# Patient Record
Sex: MALE | Race: WHITE
[De-identification: names, ages, dates, MRNs, and addresses within clinical notes are randomized per-mention and may not be internally consistent; named-entity substitution may affect disease eponyms.]

---

## 2021-03-19 ENCOUNTER — HOSPITAL ENCOUNTER (INPATIENT)
Dept: HOSPITAL 54 - ER | Age: 41
LOS: 6 days | Discharge: HOME | DRG: 254 | End: 2021-03-25
Attending: INTERNAL MEDICINE | Admitting: INTERNAL MEDICINE
Payer: MEDICAID

## 2021-03-19 VITALS — DIASTOLIC BLOOD PRESSURE: 49 MMHG | SYSTOLIC BLOOD PRESSURE: 97 MMHG

## 2021-03-19 VITALS — HEIGHT: 66 IN | BODY MASS INDEX: 16.61 KG/M2 | WEIGHT: 103.38 LBS

## 2021-03-19 VITALS — DIASTOLIC BLOOD PRESSURE: 55 MMHG | SYSTOLIC BLOOD PRESSURE: 109 MMHG

## 2021-03-19 VITALS — DIASTOLIC BLOOD PRESSURE: 56 MMHG | SYSTOLIC BLOOD PRESSURE: 110 MMHG

## 2021-03-19 VITALS — SYSTOLIC BLOOD PRESSURE: 117 MMHG | DIASTOLIC BLOOD PRESSURE: 57 MMHG

## 2021-03-19 VITALS — SYSTOLIC BLOOD PRESSURE: 97 MMHG | DIASTOLIC BLOOD PRESSURE: 49 MMHG

## 2021-03-19 VITALS — DIASTOLIC BLOOD PRESSURE: 63 MMHG | SYSTOLIC BLOOD PRESSURE: 123 MMHG

## 2021-03-19 DIAGNOSIS — R64: ICD-10-CM

## 2021-03-19 DIAGNOSIS — B20: ICD-10-CM

## 2021-03-19 DIAGNOSIS — E86.1: ICD-10-CM

## 2021-03-19 DIAGNOSIS — B95.7: ICD-10-CM

## 2021-03-19 DIAGNOSIS — E87.1: ICD-10-CM

## 2021-03-19 DIAGNOSIS — L89.313: ICD-10-CM

## 2021-03-19 DIAGNOSIS — Z88.0: ICD-10-CM

## 2021-03-19 DIAGNOSIS — L89.323: ICD-10-CM

## 2021-03-19 DIAGNOSIS — D64.9: ICD-10-CM

## 2021-03-19 DIAGNOSIS — K62.89: Primary | ICD-10-CM

## 2021-03-19 DIAGNOSIS — F15.11: ICD-10-CM

## 2021-03-19 DIAGNOSIS — K52.9: ICD-10-CM

## 2021-03-19 DIAGNOSIS — Z20.822: ICD-10-CM

## 2021-03-19 DIAGNOSIS — Z91.14: ICD-10-CM

## 2021-03-19 DIAGNOSIS — N28.9: ICD-10-CM

## 2021-03-19 DIAGNOSIS — B96.20: ICD-10-CM

## 2021-03-19 DIAGNOSIS — Z59.0: ICD-10-CM

## 2021-03-19 LAB
BASOPHILS # BLD AUTO: 0 /CMM (ref 0–0.2)
BASOPHILS NFR BLD AUTO: 0.3 % (ref 0–2)
BUN SERPL-MCNC: 17 MG/DL (ref 7–18)
CALCIUM SERPL-MCNC: 8.7 MG/DL (ref 8.5–10.1)
CHLORIDE SERPL-SCNC: 101 MMOL/L (ref 98–107)
CO2 SERPL-SCNC: 22 MMOL/L (ref 21–32)
COLOR UR: YELLOW
CREAT SERPL-MCNC: 1 MG/DL (ref 0.6–1.3)
DEPRECATED SQUAMOUS URNS QL MICRO: (no result) /HPF
EOSINOPHIL NFR BLD AUTO: 2.2 % (ref 0–6)
EOSINOPHIL NFR BLD MANUAL: 3 % (ref 0–4)
GLUCOSE SERPL-MCNC: 108 MG/DL (ref 74–106)
HCT VFR BLD AUTO: 17 % (ref 39–51)
HGB BLD-MCNC: 5.5 G/DL (ref 13.5–17.5)
IRON SERPL-MCNC: 21 UG/DL (ref 50–175)
LYMPHOCYTES NFR BLD AUTO: 0.4 /CMM (ref 0.8–4.8)
LYMPHOCYTES NFR BLD AUTO: 7.1 % (ref 20–44)
LYMPHOCYTES NFR BLD MANUAL: 9 % (ref 16–48)
MCHC RBC AUTO-ENTMCNC: 32 G/DL (ref 31–36)
MCV RBC AUTO: 82 FL (ref 80–96)
MONOCYTES NFR BLD AUTO: 0.6 /CMM (ref 0.1–1.3)
MONOCYTES NFR BLD AUTO: 10.8 % (ref 2–12)
MONOCYTES NFR BLD MANUAL: 4 % (ref 0–11)
NEUTROPHILS # BLD AUTO: 4.4 /CMM (ref 1.8–8.9)
NEUTROPHILS NFR BLD AUTO: 79.6 % (ref 43–81)
NEUTS BAND NFR BLD MANUAL: 1 % (ref 0–5)
NEUTS SEG NFR BLD MANUAL: 83 % (ref 42–76)
PH UR STRIP: 7.5 [PH] (ref 5–8)
PLATELET # BLD AUTO: 272 /CMM (ref 150–450)
POTASSIUM SERPL-SCNC: 4.9 MMOL/L (ref 3.5–5.1)
RBC # BLD AUTO: 2.11 MIL/UL (ref 4.5–6)
RBC #/AREA URNS HPF: (no result) /HPF (ref 0–2)
SODIUM SERPL-SCNC: 132 MMOL/L (ref 136–145)
TIBC SERPL-MCNC: 171 UG/DL (ref 250–450)
UROBILINOGEN UR STRIP-MCNC: 1 EU/DL
WBC #/AREA URNS HPF: (no result) /HPF (ref 0–3)
WBC NRBC COR # BLD AUTO: 5.6 K/UL (ref 4.3–11)

## 2021-03-19 PROCEDURE — A6248 HYDROGEL DRSG GEL FILLER: HCPCS

## 2021-03-19 PROCEDURE — 30233N1 TRANSFUSION OF NONAUTOLOGOUS RED BLOOD CELLS INTO PERIPHERAL VEIN, PERCUTANEOUS APPROACH: ICD-10-PCS | Performed by: EMERGENCY MEDICINE

## 2021-03-19 PROCEDURE — G0378 HOSPITAL OBSERVATION PER HR: HCPCS

## 2021-03-19 PROCEDURE — A6253 ABSORPT DRG > 48 SQ IN W/O B: HCPCS

## 2021-03-19 PROCEDURE — C9803 HOPD COVID-19 SPEC COLLECT: HCPCS

## 2021-03-19 PROCEDURE — A6403 STERILE GAUZE>16 <= 48 SQ IN: HCPCS

## 2021-03-19 RX ADMIN — FLUCONAZOLE SCH MG: 100 TABLET ORAL at 21:44

## 2021-03-19 SDOH — ECONOMIC STABILITY - HOUSING INSECURITY: HOMELESSNESS: Z59.0

## 2021-03-19 NOTE — NUR
BIBS. TO ER BED 6. AAOX4 NOT IN RESP DISTRESS. AMBULATORY. CAME IN FOR A 
CHRONIC WOUND PERIANALLY TAHT IS EXTENDING TO THE LEFT AND RIGHT BUTTOCKS. 
WOUND HAS BEEN SINCE LAST MAY. NOTED RED TISUE THAT HAVE WHITE SPOTS. AWAITING 
MD FOR GREGORY

## 2021-03-19 NOTE — NUR
RN notes

Pt refused to have SCD compression. Explained risks and benefits. Offered multiple times. Pt 
keep refusing. Will continue to monitor.

## 2021-03-19 NOTE — NUR
RN Opening notes

Received Pt from ER nurse, LC Montes. Pt arrived at the unit with a gurney and ACLS 
protocol. Pt is alert and orientedX4. Respiration is normal in room air. No SOB. No S/S of 
distress noted. Pt came with  one unit PRBC transfusing. Blood transfusion started at 2020. 
Blood consent is signed by Pt. No S/S of distress noted. No transfusion reaction noted. IV 
site at LAC# 20 is clean, intact and infusing well blood transfusion. Skin assessment is 
done and performed and picture of pt's sacrum is taken placed at pt's chart. Pt's belonging 
is checked by SHAHRAM Fair, signed by pt and placed at pt's chart. Reorient Pt to the room 
and the use of call light. Pt verbalized understanding. Safety precautions is maintained. 
Bed at low position, brakes locked, side rails upX2 and call light is within reach. Will 
continue to monitor.

## 2021-03-20 VITALS — SYSTOLIC BLOOD PRESSURE: 100 MMHG | DIASTOLIC BLOOD PRESSURE: 56 MMHG

## 2021-03-20 VITALS — DIASTOLIC BLOOD PRESSURE: 61 MMHG | SYSTOLIC BLOOD PRESSURE: 105 MMHG

## 2021-03-20 VITALS — SYSTOLIC BLOOD PRESSURE: 93 MMHG | DIASTOLIC BLOOD PRESSURE: 51 MMHG

## 2021-03-20 VITALS — DIASTOLIC BLOOD PRESSURE: 54 MMHG | SYSTOLIC BLOOD PRESSURE: 96 MMHG

## 2021-03-20 VITALS — SYSTOLIC BLOOD PRESSURE: 107 MMHG | DIASTOLIC BLOOD PRESSURE: 58 MMHG

## 2021-03-20 VITALS — DIASTOLIC BLOOD PRESSURE: 52 MMHG | SYSTOLIC BLOOD PRESSURE: 108 MMHG

## 2021-03-20 VITALS — DIASTOLIC BLOOD PRESSURE: 52 MMHG | SYSTOLIC BLOOD PRESSURE: 110 MMHG

## 2021-03-20 VITALS — SYSTOLIC BLOOD PRESSURE: 97 MMHG | DIASTOLIC BLOOD PRESSURE: 43 MMHG

## 2021-03-20 VITALS — DIASTOLIC BLOOD PRESSURE: 58 MMHG | SYSTOLIC BLOOD PRESSURE: 103 MMHG

## 2021-03-20 VITALS — SYSTOLIC BLOOD PRESSURE: 100 MMHG | DIASTOLIC BLOOD PRESSURE: 55 MMHG

## 2021-03-20 LAB
BASOPHILS # BLD AUTO: 0 /CMM (ref 0–0.2)
BASOPHILS NFR BLD AUTO: 0.5 % (ref 0–2)
BUN SERPL-MCNC: 13 MG/DL (ref 7–18)
CALCIUM SERPL-MCNC: 8.1 MG/DL (ref 8.5–10.1)
CHLORIDE SERPL-SCNC: 97 MMOL/L (ref 98–107)
CHOLEST SERPL-MCNC: 56 MG/DL (ref ?–200)
CO2 SERPL-SCNC: 20 MMOL/L (ref 21–32)
CREAT SERPL-MCNC: 0.7 MG/DL (ref 0.6–1.3)
EOSINOPHIL NFR BLD AUTO: 2.4 % (ref 0–6)
GLUCOSE SERPL-MCNC: 100 MG/DL (ref 74–106)
HCT VFR BLD AUTO: 25 % (ref 39–51)
HDLC SERPL-MCNC: < 10 MG/DL (ref 40–60)
HGB BLD-MCNC: 8.1 G/DL (ref 13.5–17.5)
LDLC SERPL DIRECT ASSAY-MCNC: 18 MG/DL (ref 0–99)
LYMPHOCYTES NFR BLD AUTO: 0.4 /CMM (ref 0.8–4.8)
LYMPHOCYTES NFR BLD AUTO: 6.9 % (ref 20–44)
MAGNESIUM SERPL-MCNC: 2 MG/DL (ref 1.8–2.4)
MCHC RBC AUTO-ENTMCNC: 32 G/DL (ref 31–36)
MCV RBC AUTO: 84 FL (ref 80–96)
MONOCYTES NFR BLD AUTO: 0.6 /CMM (ref 0.1–1.3)
MONOCYTES NFR BLD AUTO: 10.7 % (ref 2–12)
NEUTROPHILS # BLD AUTO: 4.3 /CMM (ref 1.8–8.9)
NEUTROPHILS NFR BLD AUTO: 79.5 % (ref 43–81)
PHOSPHATE SERPL-MCNC: 3.3 MG/DL (ref 2.5–4.9)
PLATELET # BLD AUTO: 256 /CMM (ref 150–450)
POTASSIUM SERPL-SCNC: 4.2 MMOL/L (ref 3.5–5.1)
RBC # BLD AUTO: 3 MIL/UL (ref 4.5–6)
SODIUM SERPL-SCNC: 128 MMOL/L (ref 136–145)
TRIGL SERPL-MCNC: 228 MG/DL (ref 30–150)
TSH SERPL DL<=0.005 MIU/L-ACNC: 3.89 UIU/ML (ref 0.36–3.74)
WBC NRBC COR # BLD AUTO: 5.4 K/UL (ref 4.3–11)

## 2021-03-20 RX ADMIN — FLUCONAZOLE SCH MG: 100 TABLET ORAL at 08:35

## 2021-03-20 RX ADMIN — SODIUM CHLORIDE PRN MLS/HR: 9 INJECTION, SOLUTION INTRAVENOUS at 04:43

## 2021-03-20 RX ADMIN — PANTOPRAZOLE SODIUM SCH MG: 40 TABLET, DELAYED RELEASE ORAL at 08:35

## 2021-03-20 RX ADMIN — DEXTROSE MONOHYDRATE SCH MLS/HR: 50 INJECTION, SOLUTION INTRAVENOUS at 21:11

## 2021-03-20 RX ADMIN — DEXTROSE MONOHYDRATE SCH MLS/HR: 50 INJECTION, SOLUTION INTRAVENOUS at 09:01

## 2021-03-20 RX ADMIN — Medication SCH MG: at 21:11

## 2021-03-20 RX ADMIN — SULFAMETHOXAZOLE AND TRIMETHOPRIM SCH UDTAB: 800; 160 TABLET ORAL at 08:35

## 2021-03-20 RX ADMIN — SODIUM CHLORIDE PRN MLS/HR: 9 INJECTION, SOLUTION INTRAVENOUS at 21:12

## 2021-03-20 NOTE — NUR
RN MS closing notes

Pt is resting in bed comfortably. Pt is alert and orientedX4. Respiration is normal in room 
air with O2 sat is 100%. No SOB. No S/S of distress noted. VS is stable. Afebrile. IV site 
at L forearm# 20 is clean, intact, flushes well and infuising well NS@ 75 ml/hr. Wound care 
provided. Safety precautions is maintained. Bed at low position, brakes locked, side rails 
upX2, urinal at the bed side and call light is within reach. Will endorse to morning nurse 
for JUSTIN.

## 2021-03-20 NOTE — NUR
MS RN CHANGE OF SHIFT NOTES



PATIENT IN BED, AWAKE, A/O X 4. PATIENT'S BREATHING EVEN AND UNLABORED, NO S/S OF 
RESPIRATORY DISTRESS, NO SOB OR DYSPNEA. IV FLUIDS RUNNING AT 75ML/HR. PATIENT TOLERATING 
ROOM AIR AT 95% O2 SAT. WOUND CARE GIVEN, WOUND COVERED WITH FOAM DRESSING. SAFETY 
PRECAUTIONS IN PLACE: BED LOCKED AND IN LOWEST POSITION, SIDE RAILS UP, URINAL AND CALL 
LIGHT WITHIN REACH. ENCOURAGED PATIENT TO USE CALL LIGHT IF IN NEED. ENDORSED TO NIGHT SHIFT 
NURSE.

## 2021-03-20 NOTE — NUR
RN notes

Second unit of PRBC is started infusing. PRBC is checked and witness by LC Harmon. VS is 
stable. Afebrile. Pt verbalize understanding. Will continue to monitor.

## 2021-03-20 NOTE — NUR
RN notes

1 Unit PRBC is done transfusing. No S/S of distress noted. No transfusion reaction noted. VS 
is stable. Pt tolerated well. Will continue to monitor.

## 2021-03-20 NOTE — NUR
MS RN OPENING NOTES



PATIENT IN BED, AWAKE, A/O X 4. PATIENT'S BREATHING EVEN AND UNLABORED, NO S/S OF 
RESPIRATORY DISTRESS, NO SOB OR DYSPNEA. IV FLUIDS RUNNING. PATIENT TOLERATING ROOM AIR AT 
98% O2 SAT. SAFETY PRECAUTIONS IN PLACE: BED LOCKED AND IN LOWEST POSITION, SIDE RAILS UP, 
URINAL AND CALL LIGHT WITHIN REACH. ENCOURAGED PATIENT TO USE CALL LIGHT IF IN NEED.

## 2021-03-20 NOTE — NUR
RN notes

Second blood transfusion is finished. VS is stable. Afebrile. No S/S of distress noted. No 
transfusion reaction noted. Pt tolerated well. Will continue to monitor.

## 2021-03-21 VITALS — SYSTOLIC BLOOD PRESSURE: 102 MMHG | DIASTOLIC BLOOD PRESSURE: 55 MMHG

## 2021-03-21 VITALS — DIASTOLIC BLOOD PRESSURE: 58 MMHG | SYSTOLIC BLOOD PRESSURE: 104 MMHG

## 2021-03-21 LAB
*BASOS: 0 %
*EOS: 3 %
*HCT: 17.5 % (ref 37.5–51)
*HGB: 5.2 G/DL (ref 13–17.7)
*IMMATURE GRANULOCYTES(ABS): 0.1 X10E3/UL (ref 0–0.1)
*IMMATURE GRANULOCYTES: 1 %
*LYMPHOCYTES: 10 %
*MCH: 24.9 PG (ref 26.6–33)
*MCHC: 29.7 G/DL (ref 31.5–35.7)
*MCV: 84 FL (ref 79–97)
*MONOCYTES: 8 %
*NEUTROPHILS: 78 %
*NRBC: (no result) %
*PLT: 249 X10E3/UL (ref 150–450)
*RBC: 2.09 X10E6/UL (ref 4.14–5.8)
*RDW: 18.3 % (ref 11.6–15.4)
ALBUMIN SERPL BCP-MCNC: 2.2 G/DL (ref 3.4–5)
ALP SERPL-CCNC: 103 U/L (ref 46–116)
ALT SERPL W P-5'-P-CCNC: 12 U/L (ref 12–78)
AST SERPL W P-5'-P-CCNC: 21 U/L (ref 15–37)
BASOPHILS # BLD AUTO: 0 /CMM (ref 0–0.2)
BASOPHILS NFR BLD AUTO: 0.5 % (ref 0–2)
BILIRUB SERPL-MCNC: 0.3 MG/DL (ref 0.2–1)
BUN SERPL-MCNC: 10 MG/DL (ref 7–18)
BUN SERPL-MCNC: 11 MG/DL (ref 7–18)
CALCIUM SERPL-MCNC: 8.3 MG/DL (ref 8.5–10.1)
CALCIUM SERPL-MCNC: 8.3 MG/DL (ref 8.5–10.1)
CHLORIDE SERPL-SCNC: 97 MMOL/L (ref 98–107)
CHLORIDE SERPL-SCNC: 98 MMOL/L (ref 98–107)
CO2 SERPL-SCNC: 23 MMOL/L (ref 21–32)
CO2 SERPL-SCNC: 24 MMOL/L (ref 21–32)
CREAT SERPL-MCNC: 0.8 MG/DL (ref 0.6–1.3)
CREAT SERPL-MCNC: 0.8 MG/DL (ref 0.6–1.3)
EOSINOPHIL NFR BLD AUTO: 1.4 % (ref 0–6)
GLUCOSE SERPL-MCNC: 103 MG/DL (ref 74–106)
GLUCOSE SERPL-MCNC: 106 MG/DL (ref 74–106)
HCT VFR BLD AUTO: 29 % (ref 39–51)
HGB BLD-MCNC: 9.1 G/DL (ref 13.5–17.5)
LYMPHOCYTES NFR BLD AUTO: 0.3 /CMM (ref 0.8–4.8)
LYMPHOCYTES NFR BLD AUTO: 6.7 % (ref 20–44)
MAGNESIUM SERPL-MCNC: 1.9 MG/DL (ref 1.8–2.4)
MAGNESIUM SERPL-MCNC: 2 MG/DL (ref 1.8–2.4)
MCHC RBC AUTO-ENTMCNC: 32 G/DL (ref 31–36)
MCV RBC AUTO: 84 FL (ref 80–96)
MONOCYTES NFR BLD AUTO: 0.5 /CMM (ref 0.1–1.3)
MONOCYTES NFR BLD AUTO: 10.3 % (ref 2–12)
NEUTROPHILS # BLD AUTO: 4.2 /CMM (ref 1.8–8.9)
NEUTROPHILS NFR BLD AUTO: 81.1 % (ref 43–81)
PHOSPHATE SERPL-MCNC: 3 MG/DL (ref 2.5–4.9)
PHOSPHATE SERPL-MCNC: 3.1 MG/DL (ref 2.5–4.9)
PLATELET # BLD AUTO: 241 /CMM (ref 150–450)
POTASSIUM SERPL-SCNC: 4.4 MMOL/L (ref 3.5–5.1)
POTASSIUM SERPL-SCNC: 4.5 MMOL/L (ref 3.5–5.1)
PROT SERPL-MCNC: 7.1 G/DL (ref 6.4–8.2)
RBC # BLD AUTO: 3.39 MIL/UL (ref 4.5–6)
SODIUM SERPL-SCNC: 128 MMOL/L (ref 136–145)
SODIUM SERPL-SCNC: 129 MMOL/L (ref 136–145)
TSH SERPL DL<=0.005 MIU/L-ACNC: 2.42 UIU/ML (ref 0.36–3.74)
URATE SERPL-MCNC: 2.8 MG/DL (ref 2.6–7.2)
WBC NRBC COR # BLD AUTO: 5.1 K/UL (ref 4.3–11)

## 2021-03-21 RX ADMIN — MEROPENEM SCH MLS/HR: 1 INJECTION INTRAVENOUS at 21:11

## 2021-03-21 RX ADMIN — DEXTROSE MONOHYDRATE SCH MLS/HR: 50 INJECTION, SOLUTION INTRAVENOUS at 20:41

## 2021-03-21 RX ADMIN — MEROPENEM SCH MLS/HR: 1 INJECTION INTRAVENOUS at 12:15

## 2021-03-21 RX ADMIN — Medication SCH MG: at 17:11

## 2021-03-21 RX ADMIN — DEXTROSE MONOHYDRATE SCH MLS/HR: 50 INJECTION, SOLUTION INTRAVENOUS at 09:38

## 2021-03-21 RX ADMIN — PANTOPRAZOLE SODIUM SCH MG: 40 TABLET, DELAYED RELEASE ORAL at 08:29

## 2021-03-21 RX ADMIN — FLUCONAZOLE SCH MG: 100 TABLET ORAL at 08:30

## 2021-03-21 RX ADMIN — SULFAMETHOXAZOLE AND TRIMETHOPRIM SCH UDTAB: 800; 160 TABLET ORAL at 08:30

## 2021-03-21 RX ADMIN — Medication SCH MG: at 08:46

## 2021-03-21 RX ADMIN — SODIUM CHLORIDE PRN MLS/HR: 9 INJECTION, SOLUTION INTRAVENOUS at 19:07

## 2021-03-21 NOTE — NUR
RN NOTES

RECEIVED PATIENT IN BED, ALERT AND ORIENTED X4, STABLE ON ROOM AIR, NO COMPLAIN OF PAIN, NO 
DISTRESS, IVF INFUSING, USES URINAL TO VOID, AMBULATES TO THE TOILET WITH SUPERVISION, KEPT 
DRY AND CLEAN, WILL CONTINUE TO MONITOR

## 2021-03-21 NOTE — NUR
MS RN CLOSING NOTES



PATIENT AWAKE AND RESTING IN BED AT THIS TIME. A/O X 4. ABLE TO MAKE NEEDS KNOWN. AMBULATORY 
WITH STEADY GAIT. TOLERATING  ROOM AIR WITH NO SOB NOTED DURING SHIFT. IV ACCESS ON LFA G#20 
INTACT AND PATENT WITH IVF OF NS @75ML/HR INFUSING WELL, NO S/S OF INFILTRATION AT SITE 
NOTED. ALL NEEDS AND CARE ATTENDED WELL. SAFETY PRECAUTIONS IN PLACE: BED LOCKED AND IN 
LOWEST POSITION, SIDE RAILS UPX2 AND CALL LIGHT WITHIN REACH. WILL ENDORSE PLAN OF CARE TO 
NIGHT SHIFT NURSE.

## 2021-03-21 NOTE — NUR
MS RN OPENING NOTES



RECEIVED PATIENT IN BED AWAKE, A/O X 4. ABLE TO MAKE NEEDS KNOWN, DENIES PAIN OR ANY 
DISCOMFORTS AT THIS TIME. ON ROOM AIR, BREATHING EVEN AND UNLABORED. IV ACCESS ON LFA G#20 
INTACT AND PATENT WITH IVF OF NS @75ML/HR INFUSING WELL. SAFETY PRECAUTIONS IN PLACE: BED 
LOCKED AND IN LOWEST POSITION, SIDE RAILS UPX2 AND CALL LIGHT WITHIN REACH. WILL CONTINUE TO 
MONITOR PT ACCORDINGLY.

## 2021-03-22 VITALS — SYSTOLIC BLOOD PRESSURE: 110 MMHG | DIASTOLIC BLOOD PRESSURE: 68 MMHG

## 2021-03-22 VITALS — DIASTOLIC BLOOD PRESSURE: 54 MMHG | SYSTOLIC BLOOD PRESSURE: 90 MMHG

## 2021-03-22 VITALS — SYSTOLIC BLOOD PRESSURE: 115 MMHG | DIASTOLIC BLOOD PRESSURE: 58 MMHG

## 2021-03-22 LAB
*% CD 4 POS. LYMPH: 14.2 % (ref 30.8–58.5)
*% CD 8 POS. LYMPH: 66.4 % (ref 12–35.5)
*ABSOLUTE CD 4 HELPER: 85 /UL (ref 359–1519)
*ABSOLUTE CD 8 SUPPRESSOR: 398 /UL (ref 109–897)
*CD4/CD8 RATIO: 0.21 (ref 0.92–3.72)

## 2021-03-22 RX ADMIN — DOXYCYCLINE HYCLATE SCH MG: 100 TABLET, COATED ORAL at 21:35

## 2021-03-22 RX ADMIN — FLUCONAZOLE SCH MG: 100 TABLET ORAL at 09:02

## 2021-03-22 RX ADMIN — PANTOPRAZOLE SODIUM SCH MG: 40 TABLET, DELAYED RELEASE ORAL at 08:23

## 2021-03-22 RX ADMIN — MEROPENEM SCH MLS/HR: 1 INJECTION INTRAVENOUS at 21:34

## 2021-03-22 RX ADMIN — DEXTROSE MONOHYDRATE SCH MLS/HR: 50 INJECTION, SOLUTION INTRAVENOUS at 09:01

## 2021-03-22 RX ADMIN — MEROPENEM SCH MLS/HR: 1 INJECTION INTRAVENOUS at 04:34

## 2021-03-22 RX ADMIN — MEROPENEM SCH MLS/HR: 1 INJECTION INTRAVENOUS at 13:45

## 2021-03-22 RX ADMIN — SODIUM CHLORIDE PRN MLS/HR: 9 INJECTION, SOLUTION INTRAVENOUS at 21:34

## 2021-03-22 RX ADMIN — SULFAMETHOXAZOLE AND TRIMETHOPRIM SCH UDTAB: 800; 160 TABLET ORAL at 09:02

## 2021-03-22 RX ADMIN — Medication SCH MG: at 16:29

## 2021-03-22 RX ADMIN — Medication SCH MG: at 09:02

## 2021-03-22 NOTE — NUR
RN NOTES

REPORTED BY CNA, TEMP 100.1, RECHECKED TEMP 99.8F ORAL, GIVEN TYLENOL, PATIENT REFUSING TO 
REMOVE EXTRA BLANKET

## 2021-03-22 NOTE — NUR
RN NOTES PM SHIFT

ALERT AND ORIENTED X4, ROOM AIR, NO COMPLAIN OF PAIN, NO DISTRESS, USES URINAL, PROSTITIS 
BILATERAL BUTTOCKS, KEPT DRY AND CLEAN, AWAITING WOUND CARE CONSULT, MERREM Q8HRS AND 
DOXYCYCLINE Q12HRS, FOLLOW UP CULTURES, MONITOR H/H, TRANSFUSE PRN, IVF AT 75ML/HR

## 2021-03-22 NOTE — NUR
RN MS NOTES



IV ACCESS ON LFA #20 WAS LEAKING. RN REPLACED THAT IV ACCESS WITH A NEW ONE GAUGE #24.

## 2021-03-22 NOTE — NUR
RN NOTES 



PT FOR EXCISIONAL DEBRIDEMENT OF B/L BUTTOCKS WOUNDS TOMORROW. PROCEDURES EXPLAINED TO PT 
AND VERBALIZED UNDERSTANDING. CONSENT SIGNED AND FILED IN HIS CHART. WILL PREPARE SUPPLIES 
AND WILL ENDORSE.

## 2021-03-22 NOTE — NUR
MS RN OPENING NOTES



PATIENT AWAKE AND WATCHING TV IN BED AT THIS TIME. A/O X 4. ABLE TO MAKE NEEDS KNOWN. 
AMBULATORY WITH STEADY GAIT. TOLERATING  ROOM AIR WITH NO SOB NOTED. IV ACCESS ON LFA G#20 
INTACT AND PATENT WITH IVF OF NS @75ML/HR INFUSING WELL. ALL NEEDS AND CARE ATTENDED WELL. 
SAFETY PRECAUTIONS IN PLACE: BED LOCKED AND IN LOWEST POSITION, SIDE RAILS UPX2 AND CALL 
LIGHT WITHIN REACH. WILL CONTINUE TO MONITOR THE PT.

## 2021-03-22 NOTE — NUR
MS RN CLOSING NOTES



PATIENT IN BED WATCHING TV AT THIS TIME. A/O X 4. ABLE TO MAKE NEEDS KNOWN. TOLERATING  ROOM 
AIR WITH NO SOB NOTED DURING SHIFT. IV ACCESS ON LFA G#22 INTACT AND PATENT, IVF OF NS 
@75ML/HR INFUSING WELL, NO S/S OF INFILTRATION AT SITE NOTED. ALL NEEDS AND CARE ATTENDED 
WELL. SAFETY MEASURES IN PLACE: BED LOCKED AND IN LOWEST POSITION, SIDE RAILS UPX2 AND CALL 
LIGHT WITHIN REACH. WILL ENDORSE JUSTIN TO NIGHT SHIFT NURSE.

## 2021-03-22 NOTE — NUR
WOUND CARE CONSULT: PT PRESENTS WITH DRY LESIONS TO INNER BUTTOCKS AND LEFT BUTTOCK, PRESENT 
ON ADMISSION. PT REPORTS THAT HE HAD LESIONS PREVIOUSLY AND THAT THEY CLEARED UP, THEN 
RECURRED. DEFER TO INFECTIOUS DISEASE TEAM CURRENTLY ON CASE. DISCUSSED SKIN PROTECTION WITH 
NURSING STAFF. WILL SEE PRN. MD IN AGREEMENT WITH PLAN OF CARE. 

-------------------------------------------------------------------------------

Addendum: 03/22/21 at 0814 by MELODIE GRIJALVA WNDNU

-------------------------------------------------------------------------------

Amended: Links added.

## 2021-03-23 VITALS — SYSTOLIC BLOOD PRESSURE: 116 MMHG | DIASTOLIC BLOOD PRESSURE: 57 MMHG

## 2021-03-23 VITALS — DIASTOLIC BLOOD PRESSURE: 57 MMHG | SYSTOLIC BLOOD PRESSURE: 100 MMHG

## 2021-03-23 VITALS — DIASTOLIC BLOOD PRESSURE: 54 MMHG | SYSTOLIC BLOOD PRESSURE: 102 MMHG

## 2021-03-23 LAB
ALBUMIN SERPL BCP-MCNC: 2.3 G/DL (ref 3.4–5)
ALP SERPL-CCNC: 122 U/L (ref 46–116)
ALT SERPL W P-5'-P-CCNC: 25 U/L (ref 12–78)
AST SERPL W P-5'-P-CCNC: 27 U/L (ref 15–37)
BASOPHILS # BLD AUTO: 0 /CMM (ref 0–0.2)
BASOPHILS NFR BLD AUTO: 0.7 % (ref 0–2)
BILIRUB SERPL-MCNC: 0.4 MG/DL (ref 0.2–1)
BUN SERPL-MCNC: 12 MG/DL (ref 7–18)
CALCIUM SERPL-MCNC: 8.5 MG/DL (ref 8.5–10.1)
CHLORIDE SERPL-SCNC: 100 MMOL/L (ref 98–107)
CO2 SERPL-SCNC: 23 MMOL/L (ref 21–32)
CREAT SERPL-MCNC: 0.6 MG/DL (ref 0.6–1.3)
EOSINOPHIL NFR BLD AUTO: 1.2 % (ref 0–6)
GLUCOSE SERPL-MCNC: 99 MG/DL (ref 74–106)
HCT VFR BLD AUTO: 30 % (ref 39–51)
HGB BLD-MCNC: 9.7 G/DL (ref 13.5–17.5)
LYMPHOCYTES NFR BLD AUTO: 0.4 /CMM (ref 0.8–4.8)
LYMPHOCYTES NFR BLD AUTO: 10.8 % (ref 20–44)
MAGNESIUM SERPL-MCNC: 1.8 MG/DL (ref 1.8–2.4)
MCHC RBC AUTO-ENTMCNC: 32 G/DL (ref 31–36)
MCV RBC AUTO: 83 FL (ref 80–96)
MONOCYTES NFR BLD AUTO: 0.3 /CMM (ref 0.1–1.3)
MONOCYTES NFR BLD AUTO: 10.1 % (ref 2–12)
NEUTROPHILS # BLD AUTO: 2.6 /CMM (ref 1.8–8.9)
NEUTROPHILS NFR BLD AUTO: 77.2 % (ref 43–81)
PHOSPHATE SERPL-MCNC: 1.9 MG/DL (ref 2.5–4.9)
PLATELET # BLD AUTO: 203 /CMM (ref 150–450)
POTASSIUM SERPL-SCNC: 3.5 MMOL/L (ref 3.5–5.1)
PROT SERPL-MCNC: 7.6 G/DL (ref 6.4–8.2)
RBC # BLD AUTO: 3.61 MIL/UL (ref 4.5–6)
SODIUM SERPL-SCNC: 132 MMOL/L (ref 136–145)
WBC NRBC COR # BLD AUTO: 3.4 K/UL (ref 4.3–11)

## 2021-03-23 PROCEDURE — 0JB90ZZ EXCISION OF BUTTOCK SUBCUTANEOUS TISSUE AND FASCIA, OPEN APPROACH: ICD-10-PCS | Performed by: NURSE PRACTITIONER

## 2021-03-23 RX ADMIN — SULFAMETHOXAZOLE AND TRIMETHOPRIM SCH UDTAB: 800; 160 TABLET ORAL at 09:36

## 2021-03-23 RX ADMIN — DOXYCYCLINE HYCLATE SCH MG: 100 TABLET, COATED ORAL at 22:10

## 2021-03-23 RX ADMIN — Medication SCH MG: at 09:42

## 2021-03-23 RX ADMIN — FLUCONAZOLE SCH MG: 100 TABLET ORAL at 09:36

## 2021-03-23 RX ADMIN — PANTOPRAZOLE SODIUM SCH MG: 40 TABLET, DELAYED RELEASE ORAL at 09:36

## 2021-03-23 RX ADMIN — MEROPENEM SCH MLS/HR: 1 INJECTION INTRAVENOUS at 04:59

## 2021-03-23 RX ADMIN — Medication SCH EA: at 18:32

## 2021-03-23 RX ADMIN — Medication SCH MG: at 17:39

## 2021-03-23 RX ADMIN — SODIUM CHLORIDE PRN MLS/HR: 9 INJECTION, SOLUTION INTRAVENOUS at 22:17

## 2021-03-23 RX ADMIN — DOXYCYCLINE HYCLATE SCH MG: 100 TABLET, COATED ORAL at 09:36

## 2021-03-23 RX ADMIN — MEROPENEM SCH MLS/HR: 1 INJECTION INTRAVENOUS at 22:17

## 2021-03-23 RX ADMIN — MEROPENEM SCH MLS/HR: 1 INJECTION INTRAVENOUS at 12:10

## 2021-03-23 NOTE — NUR
RN NOTES 



PATIENT WOUND DEBRIEDMENT BY PATSY NP DONE, PT TOLERATED PROCEDURE WELL , WOUND TREATMENT AND 
DRESSING CHANGE DONE AS ORDERED

## 2021-03-23 NOTE — NUR
MS RN OPENING NOTES



PATIENT IN ASSISTED TO BATHROOM AND BACK TO BED. REPORTS HAVIGN SMALL BM. DRESSING HAD TO BE 
REOMVED FOR BATHROOM USE. BUTTOCK WOUND CLEANED AND DRESSING CHANGED PER ORDERS.  A/O X 4. 
ON ROOM AIR DENIES SOB  IV ACCESS ON LFA G#22 INTACT AND PATENT, IVF OF NS @75ML/HR NO S/S 
OF INFILTRATION AT SITE SAFETY MEASURES IN PLACE: BED LOCKED AND IN LOWEST POSITION, SIDE 
RAILS UPX2 AND CALL LIGHT WITHIN REACH. VERBALIZED UNDERSTANDING TO CALL FOR ASSISTANCE IF 
NEEDED

## 2021-03-23 NOTE — NUR
MS RN OPENING NOTES



PATIENT IN BED WATCHING TV AT THIS TIME. A/O X 4. ABLE TO MAKE NEEDS KNOWN. TOLERATING  ROOM 
AIR WITH NO SOB NOTED DURING SHIFT. IV ACCESS ON LFA G#22 INTACT AND PATENT, IVF OF NS 
@75ML/HR INFUSING WELL, NO S/S OF INFILTRATION AT SITE NOTED. ALL NEEDS AND CARE ATTENDED 
WELL. SAFETY MEASURES IN PLACE: BED LOCKED AND IN LOWEST POSITION, SIDE RAILS UPX2 AND CALL 
LIGHT WITHIN REACH.

## 2021-03-24 VITALS — DIASTOLIC BLOOD PRESSURE: 76 MMHG | SYSTOLIC BLOOD PRESSURE: 129 MMHG

## 2021-03-24 VITALS — DIASTOLIC BLOOD PRESSURE: 53 MMHG | SYSTOLIC BLOOD PRESSURE: 96 MMHG

## 2021-03-24 LAB
BASOPHILS # BLD AUTO: 0 /CMM (ref 0–0.2)
BASOPHILS NFR BLD AUTO: 0.2 % (ref 0–2)
BUN SERPL-MCNC: 11 MG/DL (ref 7–18)
CALCIUM SERPL-MCNC: 8.4 MG/DL (ref 8.5–10.1)
CHLORIDE SERPL-SCNC: 99 MMOL/L (ref 98–107)
CO2 SERPL-SCNC: 24 MMOL/L (ref 21–32)
CREAT SERPL-MCNC: 0.6 MG/DL (ref 0.6–1.3)
EOSINOPHIL NFR BLD AUTO: 2.3 % (ref 0–6)
GLUCOSE SERPL-MCNC: 73 MG/DL (ref 74–106)
HCT VFR BLD AUTO: 28 % (ref 39–51)
HGB BLD-MCNC: 9.1 G/DL (ref 13.5–17.5)
LYMPHOCYTES NFR BLD AUTO: 0.3 /CMM (ref 0.8–4.8)
LYMPHOCYTES NFR BLD AUTO: 9.9 % (ref 20–44)
MAGNESIUM SERPL-MCNC: 1.9 MG/DL (ref 1.8–2.4)
MCHC RBC AUTO-ENTMCNC: 32 G/DL (ref 31–36)
MCV RBC AUTO: 83 FL (ref 80–96)
MONOCYTES NFR BLD AUTO: 0.3 /CMM (ref 0.1–1.3)
MONOCYTES NFR BLD AUTO: 7.9 % (ref 2–12)
NEUTROPHILS # BLD AUTO: 2.7 /CMM (ref 1.8–8.9)
NEUTROPHILS NFR BLD AUTO: 79.7 % (ref 43–81)
PHOSPHATE SERPL-MCNC: 2.4 MG/DL (ref 2.5–4.9)
PLATELET # BLD AUTO: 205 /CMM (ref 150–450)
POTASSIUM SERPL-SCNC: 3.8 MMOL/L (ref 3.5–5.1)
RBC # BLD AUTO: 3.43 MIL/UL (ref 4.5–6)
SODIUM SERPL-SCNC: 131 MMOL/L (ref 136–145)
WBC NRBC COR # BLD AUTO: 3.4 K/UL (ref 4.3–11)

## 2021-03-24 RX ADMIN — SULFAMETHOXAZOLE AND TRIMETHOPRIM SCH UDTAB: 800; 160 TABLET ORAL at 08:14

## 2021-03-24 RX ADMIN — Medication SCH EA: at 08:14

## 2021-03-24 RX ADMIN — Medication SCH MG: at 16:15

## 2021-03-24 RX ADMIN — FLUCONAZOLE SCH MG: 100 TABLET ORAL at 08:15

## 2021-03-24 RX ADMIN — DEXTROSE MONOHYDRATE SCH MLS/HR: 50 INJECTION, SOLUTION INTRAVENOUS at 16:17

## 2021-03-24 RX ADMIN — DOXYCYCLINE HYCLATE SCH MG: 100 TABLET, COATED ORAL at 21:12

## 2021-03-24 RX ADMIN — PANTOPRAZOLE SODIUM SCH MG: 40 TABLET, DELAYED RELEASE ORAL at 08:14

## 2021-03-24 RX ADMIN — MEROPENEM SCH MLS/HR: 1 INJECTION INTRAVENOUS at 04:36

## 2021-03-24 RX ADMIN — MEROPENEM SCH MLS/HR: 1 INJECTION INTRAVENOUS at 13:00

## 2021-03-24 RX ADMIN — Medication SCH MG: at 08:15

## 2021-03-24 RX ADMIN — DOXYCYCLINE HYCLATE SCH MG: 100 TABLET, COATED ORAL at 08:15

## 2021-03-24 RX ADMIN — SODIUM HYPOCHLORITE SCH ML: 5 SOLUTION TOPICAL at 08:20

## 2021-03-24 NOTE — NUR
MS RN CLOSING NOTES



PATIENT IN ASSISTED TO BATHROOM AND BACK TO BED. REPORTS HAVIGN SMALL BM. DRESSING HAD TO BE 
REOMVED FOR BATHROOM USE. BUTTOCK WOUND CLEANED AND DRESSING CHANGED PER ORDERS.  A/O X 4. 
ON ROOM AIR DENIES SOB  IV ACCESS ON LFA G#22 INTACT AND PATENT, IVF OF NS @75ML/HR NO S/S 
OF INFILTRATION AT SITE SAFETY MEASURES IN PLACE: BED LOCKED AND IN LOWEST POSITION, SIDE 
RAILS UPX2 AND CALL LIGHT WITHIN REACH. 

-------------------------------------------------------------------------------

Addendum: 03/24/21 at 7609 by MICHELLE WORTHINGTON RN

-------------------------------------------------------------------------------

**OPENING**

## 2021-03-24 NOTE — NUR
MS RN NOTES

RECEIVED ON BED SLEEPING,AROUSABLE TO VERBAL STIMULI,S/P WOUND DEBRIDEMENT ON 
BUTTOCKS,DRESSING INTACT AND DRY.AMBULATES WITH STEADY GAIT.IVF NS 75ML/HR RATE INFUSING 
WELL ON LFA,SITE PATENT.CALL LIGHT IN REACH,NEEDS ANTICIPATED.

## 2021-03-24 NOTE — NUR
Consult:



 consultation completed today.  Reason for consultation is community 
resources.  Patient is a 40 year old male, who came to the ED for treatment of buttocks 
wound.  Patient is also HIV positive.  SW met with the patient in his hospital room on the 
med/surg unit.  Patient was awake, lying down in bed, receptive to meeting with this SW.  
Patient is oriented x 4.  Patient was cooperative with this SW, engaged in dialogue, and 
maintained appropriate eye contact throughout this interview.  Patient reports that he lives 
with friends [address], [place] [address], however since he became sick 
about 1 month ago, he moved in with his mother, Yanna Pedraza, [address] [place] [address].  Patient gave verbal consent for staff to speak with his mother, for care 
coordination, [phone].  Patient reports that he is independent with all ADL's.  Patient 
has emergency [employer].  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 
inconsistent.  Patient reports being diagnosed as HIV positive in 2008.  Patient report 
being compliant with his medications.  Patient reports that he recently enrolled at a new 
HIV clinic, [facility] [address], [place] [address], 
[phone].  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 
using in June 2020.  Patient denies use of alcohol, cigarettes, or any other drugs.  
Discharge plans discussed, and patient stated that he will be returning to his mother's 
home.  SW explored patient's needs for community resources, and patient stated that he did 
not need any resources at this time.  SW will remain available to this patient, as needed.

## 2021-03-24 NOTE — NUR
MS RN CLOSING NOTES



PATIENT IN ASSISTED TO BATHROOM AND BACK TO BED. REPORTS HAVIGN SMALL BM. DRESSING HAD TO BE 
REOMVED FOR BATHROOM USE. BUTTOCK WOUND CLEANED AND DRESSING CHANGED PER ORDERS.  A/O X 4. 
ON ROOM AIR DENIES SOB  IV ACCESS ON LFA G#22 INTACT AND PATENT, IVF OF NS @75ML/HR NO S/S 
OF INFILTRATION AT SITE SAFETY MEASURES IN PLACE: BED LOCKED AND IN LOWEST POSITION, SIDE 
RAILS UPX2 AND CALL LIGHT WITHIN REACH. Plan of care discussed with dr kulkarni

## 2021-03-25 VITALS — DIASTOLIC BLOOD PRESSURE: 58 MMHG | SYSTOLIC BLOOD PRESSURE: 97 MMHG

## 2021-03-25 VITALS — DIASTOLIC BLOOD PRESSURE: 64 MMHG | SYSTOLIC BLOOD PRESSURE: 110 MMHG

## 2021-03-25 LAB
BASOPHILS # BLD AUTO: 0 /CMM (ref 0–0.2)
BASOPHILS NFR BLD AUTO: 1.2 % (ref 0–2)
BUN SERPL-MCNC: 15 MG/DL (ref 7–18)
CALCIUM SERPL-MCNC: 8.8 MG/DL (ref 8.5–10.1)
CHLORIDE SERPL-SCNC: 99 MMOL/L (ref 98–107)
CO2 SERPL-SCNC: 23 MMOL/L (ref 21–32)
CREAT SERPL-MCNC: 0.6 MG/DL (ref 0.6–1.3)
EOSINOPHIL NFR BLD AUTO: 2.7 % (ref 0–6)
GLUCOSE SERPL-MCNC: 96 MG/DL (ref 74–106)
HCT VFR BLD AUTO: 24 % (ref 39–51)
HGB BLD-MCNC: 7.7 G/DL (ref 13.5–17.5)
LYMPHOCYTES NFR BLD AUTO: 0.4 /CMM (ref 0.8–4.8)
LYMPHOCYTES NFR BLD AUTO: 10.5 % (ref 20–44)
MAGNESIUM SERPL-MCNC: 2 MG/DL (ref 1.8–2.4)
MCHC RBC AUTO-ENTMCNC: 32 G/DL (ref 31–36)
MCV RBC AUTO: 84 FL (ref 80–96)
MONOCYTES NFR BLD AUTO: 0.5 /CMM (ref 0.1–1.3)
MONOCYTES NFR BLD AUTO: 11.8 % (ref 2–12)
NEUTROPHILS # BLD AUTO: 3 /CMM (ref 1.8–8.9)
NEUTROPHILS NFR BLD AUTO: 73.8 % (ref 43–81)
PHOSPHATE SERPL-MCNC: 2.6 MG/DL (ref 2.5–4.9)
PLATELET # BLD AUTO: 201 /CMM (ref 150–450)
POTASSIUM SERPL-SCNC: 4.2 MMOL/L (ref 3.5–5.1)
RBC # BLD AUTO: 2.9 MIL/UL (ref 4.5–6)
SODIUM SERPL-SCNC: 131 MMOL/L (ref 136–145)
WBC NRBC COR # BLD AUTO: 4.1 K/UL (ref 4.3–11)

## 2021-03-25 RX ADMIN — PANTOPRAZOLE SODIUM SCH MG: 40 TABLET, DELAYED RELEASE ORAL at 08:21

## 2021-03-25 RX ADMIN — SODIUM HYPOCHLORITE SCH ML: 5 SOLUTION TOPICAL at 08:25

## 2021-03-25 RX ADMIN — Medication SCH MG: at 08:25

## 2021-03-25 RX ADMIN — DOXYCYCLINE HYCLATE SCH MG: 100 TABLET, COATED ORAL at 08:25

## 2021-03-25 RX ADMIN — FLUCONAZOLE SCH MG: 100 TABLET ORAL at 08:25

## 2021-03-25 RX ADMIN — DEXTROSE MONOHYDRATE SCH MLS/HR: 50 INJECTION, SOLUTION INTRAVENOUS at 16:06

## 2021-03-25 RX ADMIN — Medication SCH MG: at 17:25

## 2021-03-25 RX ADMIN — SODIUM CHLORIDE PRN MLS/HR: 9 INJECTION, SOLUTION INTRAVENOUS at 08:58

## 2021-03-25 RX ADMIN — SULFAMETHOXAZOLE AND TRIMETHOPRIM SCH UDTAB: 800; 160 TABLET ORAL at 08:25

## 2021-03-25 NOTE — NUR
MS RN DISCHARGE NOTE



PT DISCHARGED TO HOME. PT IS A/O X 4, MEDICALLY STABLE, VITAL SIGNS WNL, AND HAS NO C/O PAIN 
AT THIS TIME. DISCHARGE INSTRUCTIONS AND EDUCATION GIVEN TO PATIENT WITH SUCCESSFUL RETURN 
DEMONSTRATION AND VERBALIZATION OF UNDERSTANDING. PT EDUCATED ON CLEANING WOUND, ALONGSIDE 
MOTHER, WITH SUCCESSFUL RETURN DEMONSTRATION. IV ACCESS REMOVED. ID ARM BAND REMOVED. 
BELONGINGS LIST SIGNED, COPIED, AND GIVEN TO PATIENT/FILED IN CHART. ALLL CARE, NEEDS, 
MEDICATIONS AND TREATMENTS GIVEN AS ORDERED. PT LEFT UNIT AT 18:15 ACCOMPANIED BY ME AND HIS 
MOTHER, AMBULATORY WITH STEADY GAIT. CHARGE NURSE AND MD AWARE.

-------------------------------------------------------------------------------

Addendum: 03/25/21 at 1843 by AIDEN GIBSON RN

-------------------------------------------------------------------------------

PT REFUSED WOUND CARE AT DISCHARGE AND REFUSED PHOTO.

## 2021-03-25 NOTE — NUR
MS RN OPENING NOTE



PT RECEIVED IN BED, AWAKE AND RESPONSIVE. PT IS A/O X 4 WITH NO C/O AT THIS TIME. PT IS ON 
ROOM AIR WITH NO S/SX OF RESPIRATORY DISTRESS OR SOB AT THIS TIME. PT HAS AN IV ACCESS ON 
LEFT FOREARM G#22 PATENT, INTACT AND FLUSHING WELL WITH NO S/SX OF INFECTION, INFILTRATION 
OR IRRITATION, RUNNING NS AT 75 ML/HR. PT IS AMBULATORY WITH STEADY GAIT AND HAS BRP. SAFETY 
MEASURES IN PLACE: BED IN LOWEST, LOCKED POSITION WITH BOTH UPPER SIDE RAILS UP X 2. CALL 
LIGHT PLACED WITHIN REACH. WILL CONTINUE TO MONITOR.